# Patient Record
Sex: MALE | Race: WHITE | NOT HISPANIC OR LATINO
[De-identification: names, ages, dates, MRNs, and addresses within clinical notes are randomized per-mention and may not be internally consistent; named-entity substitution may affect disease eponyms.]

---

## 2018-05-17 ENCOUNTER — NON-APPOINTMENT (OUTPATIENT)
Age: 68
End: 2018-05-17

## 2018-05-17 ENCOUNTER — APPOINTMENT (OUTPATIENT)
Dept: CARDIOLOGY | Facility: CLINIC | Age: 68
End: 2018-05-17
Payer: MEDICARE

## 2018-05-17 VITALS
HEART RATE: 81 BPM | HEIGHT: 70 IN | DIASTOLIC BLOOD PRESSURE: 85 MMHG | OXYGEN SATURATION: 94 % | WEIGHT: 227 LBS | SYSTOLIC BLOOD PRESSURE: 127 MMHG | BODY MASS INDEX: 32.5 KG/M2

## 2018-05-17 VITALS — SYSTOLIC BLOOD PRESSURE: 124 MMHG | DIASTOLIC BLOOD PRESSURE: 80 MMHG

## 2018-05-17 PROCEDURE — 93000 ELECTROCARDIOGRAM COMPLETE: CPT

## 2018-05-17 PROCEDURE — 99215 OFFICE O/P EST HI 40 MIN: CPT

## 2018-05-17 RX ORDER — MULTIVITAMIN
TABLET ORAL
Refills: 0 | Status: ACTIVE | COMMUNITY

## 2018-06-28 ENCOUNTER — APPOINTMENT (OUTPATIENT)
Dept: CARDIOLOGY | Facility: CLINIC | Age: 68
End: 2018-06-28
Payer: MEDICARE

## 2018-06-28 PROCEDURE — 93015 CV STRESS TEST SUPVJ I&R: CPT

## 2018-07-03 ENCOUNTER — APPOINTMENT (OUTPATIENT)
Dept: CARDIOLOGY | Facility: CLINIC | Age: 68
End: 2018-07-03
Payer: MEDICARE

## 2018-07-03 PROCEDURE — 93306 TTE W/DOPPLER COMPLETE: CPT

## 2019-06-11 ENCOUNTER — NON-APPOINTMENT (OUTPATIENT)
Age: 69
End: 2019-06-11

## 2019-06-11 ENCOUNTER — APPOINTMENT (OUTPATIENT)
Dept: CARDIOLOGY | Facility: CLINIC | Age: 69
End: 2019-06-11
Payer: MEDICARE

## 2019-06-11 VITALS
DIASTOLIC BLOOD PRESSURE: 92 MMHG | SYSTOLIC BLOOD PRESSURE: 153 MMHG | OXYGEN SATURATION: 95 % | HEIGHT: 70 IN | BODY MASS INDEX: 28.92 KG/M2 | WEIGHT: 202 LBS | HEART RATE: 94 BPM

## 2019-06-11 PROCEDURE — 93000 ELECTROCARDIOGRAM COMPLETE: CPT

## 2019-06-11 PROCEDURE — 99215 OFFICE O/P EST HI 40 MIN: CPT

## 2019-06-11 RX ORDER — SITAGLIPTIN AND METFORMIN HYDROCHLORIDE 50; 1000 MG/1; MG/1
50-1000 TABLET, FILM COATED ORAL DAILY
Refills: 0 | Status: DISCONTINUED | COMMUNITY
End: 2019-06-11

## 2019-06-12 NOTE — PHYSICAL EXAM
[General Appearance - Well Developed] : well developed [General Appearance - In No Acute Distress] : no acute distress [Normal Conjunctiva] : the conjunctiva exhibited no abnormalities [No Oral Pallor] : no oral pallor [Exaggerated Use Of Accessory Muscles For Inspiration] : no accessory muscle use [Respiration, Rhythm And Depth] : normal respiratory rhythm and effort [Auscultation Breath Sounds / Voice Sounds] : lungs were clear to auscultation bilaterally [Bowel Sounds] : normal bowel sounds [Abdomen Tenderness] : non-tender [Cyanosis, Localized] : no localized cyanosis [Abnormal Walk] : normal gait [] : no rash [Oriented To Time, Place, And Person] : oriented to person, place, and time [Not Palpable] : not palpable [No Precordial Heave] : no precordial heave was noted [Normal Rate] : normal [Rhythm Regular] : regular [Normal S1] : normal S1 [Normal S2] : normal S2 [No Gallop] : no gallop heard [I] : a grade 1 [2+] : left 2+ [No Abnormalities] : the abdominal aorta was not enlarged and no bruit was heard [No Pitting Edema] : no pitting edema present [FreeTextEntry1] : no JVD is appreciated at a 45° angle [Apical Thrill] : no thrill palpable at the apex [Pericardial Rub] : no pericardial rub [Click] : no click [Right Carotid Bruit] : no bruit heard over the right carotid [Left Carotid Bruit] : no bruit heard over the left carotid

## 2019-06-12 NOTE — DISCUSSION/SUMMARY
[FreeTextEntry1] : Mr. Cruz has been stable from a cardiac symptomatic standpoint since his previous visit here on 5/17/18. Specifically, he does not describe having experienced any signs or symptoms which would be considered typical for an anginal syndrome, congestive heart failure, or a hemodynamically-compromising arrhythmia. His cardiac examination today is remarkable for a faint systolic ejection murmur, unchanged from his previous visit with me. His blood pressure reading  today is mildly elevated. His electrocardiogram today reveals mild sinus tachycardia with a non-specific RSR' pattern in lead V1 and non-specific early R wave transition in lead V2, essentially unchanged from his previous office tracing.\par \par I have explained to the patient that he was being treated previously with beta-blocker therapy not only for blood pressure control, however, specifically to reduce "shearing forces" on his ascending aorta, in view of the ascending aorta being mildly dilated. As his blood pressure is mildly elevated today, I have recommended to him that he resume Toprol-XL at a dosage of 50 mg daily. He is agreeable, and will resume this medication as of today.\par \par I have reviewed the findings of the exercise stress test of 6/28/18 and the echocardiogram of 7/3/18 in detail with the patient today.\par \par The patient is going to forward a copy of his most recent coronary calcium score report to my office for my review. If the ascending aortic diameter is noted to be stable, follow-up echocardiography for reassessment of the aortic root diameter will be deferred at this time.\par \par The patient anticipates having follow-up blood testing performed through the office of his primary care provider in the near future, and he will have a copy of the results forwarded to my office for my review and records.\par \par The importance of proper dietary habits, further weight loss, and regular exercise was again emphasized to the the patient today.\par \par The patient will be following up regularly with his primary care provider regarding management of his diabetes.\par \par I have asked the patient to call me if he should have any questions or problems pertaining to these matters, and especially if he should experience any concerning symptoms. I have otherwise asked the patient to return to the office in one month for follow-up cardiac evaluation and blood pressure reassessment (on Toprol XL), provided he remains clinically stable in the interim.

## 2019-06-12 NOTE — HISTORY OF PRESENT ILLNESS
[FreeTextEntry1] : Mr. Meghana Cruz presented to the office today for follow-up cardiac evaluation.\par \par The patient is a 69-year-old male with a history of palpitations, a heart murmur, a mildly dilated aortic root/ascending aorta, hypertension, diabetes, anxiety, and tinnitus (attributed to exposure to loud  sounds while he served in the [employer]). He was initially referred to me by his father-in-law, Mr. Clemente Perez, who I had been caring for until he passed away.\par \par The patient has been stable from a cardiac symptomatic standpoint since his previous visit with me on 5/17/18. Specifically, he has not experienced recurrence of previously described palpitations. He has not noted chest discomfort or dyspnea on exertion in association with his activities. He has not noted orthopnea, paroxysmal nocturnal dyspnea, or lower extremity edema. He has not experienced any episodes of presyncope or syncope.\par \par The patient has lost 25 pounds through dietary modification and exercise since his previous visit here on 5/17/18. Several months ago, he decided to discontinue his diabetic medications and his antihypertensive medications (Toprol-XL 50 mg q.d. and lisinopril 10 mg q.d.), thinking that he did not require these medications any longer, since he was able to lose weight. He reports his hemoglobin A1c level to have been 5.8 recently.\par \par Echocardiography most recently performed on 7/3/18 revealed normal cardiac chamber sizes with normal left ventricular wall thickness and wall motion. Left ventricular systolic function was normal, with an estimated ejection fraction of 65%. Impaired diastolic relaxation of the left ventricle was demonstrated. The aortic root was noted to be mildly dilated, measuring 4.2 cm at the level of the sinuses of Valsalva. No significant valvular abnormalities were demonstrated.\par \par Exercise stress testing most recently performed on 6/28/18 revealed the patient to exhibit fair exercise tolerance for his age, without the inducement of cardiac symptoms, electrocardiographic evidence of myocardial ischemia, or significant arrhythmias.\par \par The patient reports having undergone a coronary calcium score study recently, and states that his coronary calcium score was 0. He also states that the report revealed his aortic root diameter to be "stable". He is going to forward a copy of the report of the study to my office for my review.\par \par The patient was previously referred for a CT scan of the chest by his internist, Dr. Staples. Review of the report of the study, performed on 2/12/15 revealed that the aortic root measurement was 4.4 cm in diameter and the ascending aorta was 3.9 cm in diameter. The aortic arch and descending thoracic aorta measured 3.0 and 2.5 cm in diameter, respectively.\par \par Carotid artery Doppler testing performed at the radiologist's office on 10/1/13 was reported as not demonstrating any evidence for atherosclerosis formation or stenoses.\par \par Cardiac rhythm event monitoring to utilize for recurrent palpitations from 8/29/13 through 9/26/13 was unrevealing, as the patient did not experience any recurrence of palpitations during this monitoring period.\par \par Laboratory studies performed on 5/7/18 revealed cholesterol 147, triglycerides 153, HDL 35, and calculated LDL 81. The ALT level was mildly elevated at 58. The remainder of the liver chemistries were normal. The glucose level was 134 and hemoglobin A1c level is 7.1%. The BUN and creatinine were 13 and 1.05, respectively. The potassium level was 4.4. The hemoglobin and hematocrit were 15.9 and 46.4, respectively. The thyroid stimulating hormone level was 2.05.\par \par Previous History:\par \par At the time of a previous visit here on 8/28/13, the patient informed me that approximately 2 months prior to that visit, while having an MRI scan performed to evaluate his left shoulder, he developed an anxiety reaction, associated with a sensation of an increased heart rate. The MRI scan had to be aborted secondary to his anxiety reaction. Following this initial anxiety reaction, the patient had been experiencing recurrent episodes of feeling as if his heart was beating strongly and his heart rate was fast, associated with a feeling of anxiety. At that time, I instructed him to increase his dosage of Toprol-XL from 25 mg daily (which he was taking for treatment of hypertension) to 50 mg daily, and I referred him for further cardiac studies.\par \par As far as risk factors for coronary artery disease are concerned, the patient has a history of diabetes and hypertension. He denies having any dyslipidemia. He discontinued cigarette smoking in 1983, after having previously smoked one pack per day for several years. He does not describe having an immediate family history of premature coronary artery disease.\par \par \par \par \par

## 2019-06-12 NOTE — REASON FOR VISIT
[Hypertension] : hypertension [Palpitations] : palpitations [FreeTextEntry1] : a dilated aortic root/ascending aorta

## 2019-07-10 ENCOUNTER — NON-APPOINTMENT (OUTPATIENT)
Age: 69
End: 2019-07-10

## 2019-07-10 ENCOUNTER — APPOINTMENT (OUTPATIENT)
Dept: CARDIOLOGY | Facility: CLINIC | Age: 69
End: 2019-07-10
Payer: MEDICARE

## 2019-07-10 VITALS
HEIGHT: 70 IN | SYSTOLIC BLOOD PRESSURE: 128 MMHG | DIASTOLIC BLOOD PRESSURE: 82 MMHG | WEIGHT: 198 LBS | BODY MASS INDEX: 28.35 KG/M2 | HEART RATE: 81 BPM | OXYGEN SATURATION: 95 %

## 2019-07-10 PROCEDURE — 99215 OFFICE O/P EST HI 40 MIN: CPT

## 2019-07-10 PROCEDURE — 93000 ELECTROCARDIOGRAM COMPLETE: CPT

## 2019-07-10 NOTE — HISTORY OF PRESENT ILLNESS
[FreeTextEntry1] : Mr. Meghana Cruz presented to the office today for follow-up cardiac evaluation. I last evaluated the patient in the office on 6/11/19.\par \par The patient is a 69-year-old male with a history of palpitations, a heart murmur, a mildly dilated aortic root/ascending aorta, hypertension, diabetes, anxiety, and tinnitus (attributed to exposure to loud  sounds while he served in the [employer] He was initially referred to me by his father-in-law, Mr. Clemente Perez, who I had been caring for until he passed away.\par \par The patient has been stable from a cardiac symptomatic standpoint since his previous visit with me on 6/11/19. Specifically, he has not experienced recurrence of previously described palpitations. He has not noted chest discomfort or dyspnea on exertion in association with his activities. He has not noted orthopnea, paroxysmal nocturnal dyspnea, or lower extremity edema. He has not experienced any episodes of presyncope or syncope.\par \par Several months ago, the patient decided to discontinue his diabetic medications and his antihypertensive medications (Toprol-XL 50 mg q.d. and lisinopril 10 mg q.d.), thinking that he did not require these medications any longer, since he was able to lose weight. He reports his hemoglobin A1c level to have been 5.8 recently. At the time the patient's previous visit on 6/11/19, I explained to him that the beta-blocker (Toprol-XL) had been prescribed not only had antihypertensive medication, however, specifically to reduce "shearing forces" on the ascending aorta, and due to the patient having a history of a dilated aortic root and ascending aorta. Toprol-XL was restarted at that time at a dosage of 50 mg daily, and the patient has been tolerating the Toprol-XL without any obvious side effects.\par \par A CT-calcium scoring study performed on 2/21/19 revealed a total coronary calcium score to be zero. Evaluation of the thoracic aorta revealed the aortic root and the ascending aorta to be dilated, demonstrating maximum diameter is 4.5 cm and 4.0 cm, respectively. These dimensions were considered not to be significantly changed when compared with a previous CT study performed on 2/12/15.\par \par Echocardiography most recently performed on 7/3/18 revealed normal cardiac chamber sizes with normal left ventricular wall thickness and wall motion. Left ventricular systolic function was normal, with an estimated ejection fraction of 65%. Impaired diastolic relaxation of the left ventricle was demonstrated. The aortic root was noted to be mildly dilated, measuring 4.2 cm at the level of the sinuses of Valsalva. No significant valvular abnormalities were demonstrated.\par \par Exercise stress testing most recently performed on 6/28/18 revealed the patient to exhibit fair exercise tolerance for his age, without the inducement of cardiac symptoms, electrocardiographic evidence of myocardial ischemia, or significant arrhythmias.\par \par Carotid artery Doppler testing performed at the radiologist's office on 10/1/13 was reported as not demonstrating any evidence for atherosclerosis formation or stenoses.\par \par Cardiac rhythm event monitoring to utilize for recurrent palpitations from 8/29/13 through 9/26/13 was unrevealing, as the patient did not experience any recurrence of palpitations during this monitoring period.\par \par Laboratory studies performed on 5/7/18 revealed cholesterol 147, triglycerides 153, HDL 35, and calculated LDL 81. The ALT level was mildly elevated at 58. The remainder of the liver chemistries were normal. The glucose level was 134 and hemoglobin A1c level is 7.1%. The BUN and creatinine were 13 and 1.05, respectively. The potassium level was 4.4. The hemoglobin and hematocrit were 15.9 and 46.4, respectively. The thyroid stimulating hormone level was 2.05.\par \par Previous History:\par \par At the time of a previous visit here on 8/28/13, the patient informed me that approximately 2 months prior to that visit, while having an MRI scan performed to evaluate his left shoulder, he developed an anxiety reaction, associated with a sensation of an increased heart rate. The MRI scan had to be aborted secondary to his anxiety reaction. Following this initial anxiety reaction, the patient had been experiencing recurrent episodes of feeling as if his heart was beating strongly and his heart rate was fast, associated with a feeling of anxiety. At that time, I instructed him to increase his dosage of Toprol-XL from 25 mg daily (which he was taking for treatment of hypertension) to 50 mg daily, and I referred him for further cardiac studies.\par \par As far as risk factors for coronary artery disease are concerned, the patient has a history of diabetes and hypertension. He denies having any dyslipidemia. He discontinued cigarette smoking in 1983, after having previously smoked one pack per day for several years. He does not describe having an immediate family history of premature coronary artery disease.\par \par \par \par \par

## 2019-07-10 NOTE — DISCUSSION/SUMMARY
[FreeTextEntry1] : Mr. Cruz has been stable from a cardiac symptomatic standpoint since his previous visit here on 6/11/19. Specifically, he does not describe having experienced any signs or symptoms which would be considered typical for an anginal syndrome, congestive heart failure, or a hemodynamically-compromising arrhythmia. His cardiac examination today is remarkable for a faint systolic ejection murmur, unchanged from his previous visit with me. His blood pressure reading  today is normal. His electrocardiogram today reveals sinus rhythm with a non-specific RSR' pattern in lead V1, non-specific early R wave transition in lead V2, and non-specific repolarization changes, essentially unchanged from his previous office tracing.\par \par I have again explained to the patient that he is being treated with beta-blocker therapy not only for blood pressure control, however, specifically to reduce "shearing forces" on his ascending aorta, in view of the aortic root and ascending aorta being mildly to moderately dilated.  As his blood pressure is normal today, I have recommended to him that he continue Toprol-XL at a dosage of 50 mg daily for the time being.\par \par I have reviewed the findings of the exercise stress test of 6/28/18 and the echocardiogram of 7/3/18 in detail with the patient today.\par \par The patient is going to forward a copy of the report of his most recent blood testing performed through the office of his primary care provider, Dr. Staples, to my office for my review and records.\par \par The importance of proper dietary habits, further weight loss, and regular exercise was again emphasized to the the patient today.\par \par The patient will be following up regularly with his primary care provider regarding management of his diabetes.\par \par I have asked the patient to call me if he should have any questions or problems pertaining to these matters, and especially if he should experience any concerning symptoms. I have otherwise asked the patient to return to the office for follow-up cardiac evaluation and blood pressure reassessment in 6 months, provided he remains clinically stable in the interim. Follow-up echocardiography will be arranged at that time for reassessment of the aortic root diameter.

## 2019-07-10 NOTE — PHYSICAL EXAM
[General Appearance - Well Developed] : well developed [General Appearance - In No Acute Distress] : no acute distress [No Oral Pallor] : no oral pallor [Normal Conjunctiva] : the conjunctiva exhibited no abnormalities [Exaggerated Use Of Accessory Muscles For Inspiration] : no accessory muscle use [Respiration, Rhythm And Depth] : normal respiratory rhythm and effort [Auscultation Breath Sounds / Voice Sounds] : lungs were clear to auscultation bilaterally [Bowel Sounds] : normal bowel sounds [Abnormal Walk] : normal gait [Abdomen Tenderness] : non-tender [Oriented To Time, Place, And Person] : oriented to person, place, and time [] : no rash [Cyanosis, Localized] : no localized cyanosis [No Precordial Heave] : no precordial heave was noted [Not Palpable] : not palpable [Normal Rate] : normal [Normal S1] : normal S1 [Rhythm Regular] : regular [No Gallop] : no gallop heard [Normal S2] : normal S2 [I] : a grade 1 [2+] : left 2+ [No Abnormalities] : the abdominal aorta was not enlarged and no bruit was heard [No Pitting Edema] : no pitting edema present [FreeTextEntry1] : no JVD is appreciated at a 45° angle [Apical Thrill] : no thrill palpable at the apex [Click] : no click [Pericardial Rub] : no pericardial rub [Right Carotid Bruit] : no bruit heard over the right carotid [Left Carotid Bruit] : no bruit heard over the left carotid

## 2022-10-24 ENCOUNTER — APPOINTMENT (OUTPATIENT)
Dept: CARDIOLOGY | Facility: CLINIC | Age: 72
End: 2022-10-24

## 2022-10-24 ENCOUNTER — NON-APPOINTMENT (OUTPATIENT)
Age: 72
End: 2022-10-24

## 2022-10-24 VITALS
WEIGHT: 199 LBS | DIASTOLIC BLOOD PRESSURE: 82 MMHG | SYSTOLIC BLOOD PRESSURE: 156 MMHG | OXYGEN SATURATION: 98 % | BODY MASS INDEX: 28.49 KG/M2 | HEART RATE: 80 BPM | HEIGHT: 70 IN

## 2022-10-24 PROCEDURE — 93000 ELECTROCARDIOGRAM COMPLETE: CPT

## 2022-10-24 PROCEDURE — 99215 OFFICE O/P EST HI 40 MIN: CPT

## 2022-10-24 RX ORDER — GLUCOSAMINE HCL 500 MG
TABLET ORAL
Refills: 0 | Status: DISCONTINUED | COMMUNITY
End: 2022-10-24

## 2022-11-02 ENCOUNTER — OFFICE (OUTPATIENT)
Dept: URBAN - METROPOLITAN AREA CLINIC 109 | Facility: CLINIC | Age: 72
Setting detail: OPHTHALMOLOGY
End: 2022-11-02
Payer: MEDICARE

## 2022-11-02 DIAGNOSIS — Z96.1: ICD-10-CM

## 2022-11-02 DIAGNOSIS — H52.7: ICD-10-CM

## 2022-11-02 PROCEDURE — 99024 POSTOP FOLLOW-UP VISIT: CPT | Performed by: OPHTHALMOLOGY

## 2022-11-02 ASSESSMENT — REFRACTION_CURRENTRX
OD_OVR_VA: 20/
OD_OVR_VA: 20/
OS_SPHERE: +1.50
OS_OVR_VA: 20/
OS_OVR_VA: 20/
OS_SPHERE: +3.25
OD_SPHERE: +1.50
OD_SPHERE: +3.00

## 2022-11-02 ASSESSMENT — REFRACTION_MANIFEST
OD_VA1: 20/30-
OS_VA1: 20/30-
OS_CYLINDER: -0.75
OD_SPHERE: +0.50
OS_SPHERE: PLANO
OD_SPHERE: +2.00
OS_SPHERE: +0.75
OS_VA1: 20/20
OS_AXIS: 100
OD_VA1: 20/20-

## 2022-11-02 ASSESSMENT — KERATOMETRY
OS_K1POWER_DIOPTERS: 42.62
OS_K2POWER_DIOPTERS: 43.50
OS_AXISANGLE_DEGREES: 167
OD_AXISANGLE_DEGREES: 115
OD_K2POWER_DIOPTERS: 42.87
OD_K1POWER_DIOPTERS: 42.75

## 2022-11-02 ASSESSMENT — VISUAL ACUITY
OD_BCVA: 20/25
OS_BCVA: 20/40-2

## 2022-11-02 ASSESSMENT — LID POSITION - DERMATOCHALASIS
OS_DERMATOCHALASIS: 1+
OD_DERMATOCHALASIS: 1+

## 2022-11-02 ASSESSMENT — PACHYMETRY
OD_CT_CORRECTION: 5
OS_CT_UM: 503
OD_CT_UM: 477
OS_CT_CORRECTION: 3

## 2022-11-02 ASSESSMENT — LID POSITION - PTOSIS: OS_PTOSIS: LUL 1+

## 2022-11-02 ASSESSMENT — REFRACTION_AUTOREFRACTION
OS_SPHERE: PL
OS_CYLINDER: -0.75
OD_SPHERE: +0.50
OS_AXIS: 100

## 2022-11-02 ASSESSMENT — TONOMETRY
OD_IOP_MMHG: 14
OS_IOP_MMHG: 15

## 2022-11-03 ENCOUNTER — TRANSCRIPTION ENCOUNTER (OUTPATIENT)
Age: 72
End: 2022-11-03

## 2022-11-03 ENCOUNTER — APPOINTMENT (OUTPATIENT)
Dept: CARDIOLOGY | Facility: CLINIC | Age: 72
End: 2022-11-03

## 2022-11-03 ENCOUNTER — NON-APPOINTMENT (OUTPATIENT)
Age: 72
End: 2022-11-03

## 2022-11-03 PROCEDURE — 93306 TTE W/DOPPLER COMPLETE: CPT

## 2022-11-07 ENCOUNTER — OFFICE (OUTPATIENT)
Dept: URBAN - METROPOLITAN AREA CLINIC 109 | Facility: CLINIC | Age: 72
Setting detail: OPHTHALMOLOGY
End: 2022-11-07
Payer: MEDICARE

## 2022-11-07 DIAGNOSIS — H11.31: ICD-10-CM

## 2022-11-07 PROCEDURE — 99213 OFFICE O/P EST LOW 20 MIN: CPT | Performed by: OPHTHALMOLOGY

## 2022-11-07 ASSESSMENT — REFRACTION_CURRENTRX
OD_SPHERE: +1.50
OS_SPHERE: +1.50
OS_SPHERE: +3.25
OD_OVR_VA: 20/
OD_OVR_VA: 20/
OS_OVR_VA: 20/
OD_SPHERE: +3.00
OS_OVR_VA: 20/

## 2022-11-07 ASSESSMENT — LID POSITION - DERMATOCHALASIS
OD_DERMATOCHALASIS: 1+
OS_DERMATOCHALASIS: 1+

## 2022-11-07 ASSESSMENT — KERATOMETRY
OS_K1POWER_DIOPTERS: 42.62
OD_K2POWER_DIOPTERS: 42.87
OD_AXISANGLE_DEGREES: 115
OD_K1POWER_DIOPTERS: 42.75
OS_AXISANGLE_DEGREES: 167
OS_K2POWER_DIOPTERS: 43.50

## 2022-11-07 ASSESSMENT — REFRACTION_MANIFEST
OD_AXIS: 90
OS_CYLINDER: -0.75
OS_VA1: 20/30-
OD_CYLINDER: -0.50
OD_SPHERE: +0.50
OS_SPHERE: PLANO
OD_SPHERE: +2.00
OS_SPHERE: +0.75
OS_VA1: 20/20
OS_AXIS: 100
OD_VA1: 20/20-
OD_VA1: 20/20-

## 2022-11-07 ASSESSMENT — VISUAL ACUITY
OS_BCVA: 20/40-1
OD_BCVA: 20/25

## 2022-11-07 ASSESSMENT — TONOMETRY: OD_IOP_MMHG: 15

## 2022-11-07 ASSESSMENT — PACHYMETRY
OD_CT_CORRECTION: 5
OD_CT_UM: 477
OS_CT_UM: 503
OS_CT_CORRECTION: 3

## 2022-11-07 ASSESSMENT — SPHEQUIV_DERIVED: OD_SPHEQUIV: 0.25

## 2022-11-07 ASSESSMENT — LID POSITION - PTOSIS: OS_PTOSIS: LUL 1+

## 2022-11-07 ASSESSMENT — AXIALLENGTH_DERIVED: OD_AL: 23.749

## 2022-11-07 ASSESSMENT — REFRACTION_AUTOREFRACTION
OD_SPHERE: +0.50
OS_AXIS: 100
OS_CYLINDER: -0.75
OS_SPHERE: PL

## 2022-11-16 ENCOUNTER — OFFICE (OUTPATIENT)
Dept: URBAN - METROPOLITAN AREA CLINIC 109 | Facility: CLINIC | Age: 72
Setting detail: OPHTHALMOLOGY
End: 2022-11-16
Payer: MEDICARE

## 2022-11-16 DIAGNOSIS — H40.1111: ICD-10-CM

## 2022-11-16 DIAGNOSIS — H40.1122: ICD-10-CM

## 2022-11-16 PROBLEM — H11.31 SUBCONJUNCTIVAL HEMORRHAGE; RIGHT EYE: Status: RESOLVED | Noted: 2022-11-07 | Resolved: 2022-11-16

## 2022-11-16 PROCEDURE — 99212 OFFICE O/P EST SF 10 MIN: CPT | Performed by: OPHTHALMOLOGY

## 2022-11-16 PROCEDURE — 92083 EXTENDED VISUAL FIELD XM: CPT | Performed by: OPHTHALMOLOGY

## 2022-11-16 ASSESSMENT — PACHYMETRY
OD_CT_UM: 477
OD_CT_CORRECTION: 5
OS_CT_CORRECTION: 3
OS_CT_UM: 503

## 2022-11-16 ASSESSMENT — REFRACTION_CURRENTRX
OD_SPHERE: +1.50
OS_OVR_VA: 20/
OD_SPHERE: +3.00
OS_SPHERE: +1.50
OS_OVR_VA: 20/
OD_OVR_VA: 20/
OS_SPHERE: +3.25
OD_OVR_VA: 20/

## 2022-11-16 ASSESSMENT — REFRACTION_MANIFEST
OD_SPHERE: +2.00
OS_SPHERE: PLANO
OD_VA1: 20/20-
OS_CYLINDER: -0.75
OS_SPHERE: +0.75
OD_SPHERE: +1.00
OS_VA1: 20/20
OS_AXIS: 100
OD_VA1: 20/20+
OS_VA1: 20/30-

## 2022-11-16 ASSESSMENT — VISUAL ACUITY
OS_BCVA: 20/40-1
OD_BCVA: 20/25

## 2022-11-16 ASSESSMENT — LID POSITION - DERMATOCHALASIS
OS_DERMATOCHALASIS: 1+
OD_DERMATOCHALASIS: 1+

## 2022-11-16 ASSESSMENT — CONFRONTATIONAL VISUAL FIELD TEST (CVF)
OS_FINDINGS: FULL
OD_FINDINGS: FULL

## 2022-11-16 ASSESSMENT — TONOMETRY
OD_IOP_MMHG: 12
OS_IOP_MMHG: 15

## 2022-11-16 ASSESSMENT — KERATOMETRY
OD_K2POWER_DIOPTERS: 42.87
OS_K1POWER_DIOPTERS: 42.62
OS_K2POWER_DIOPTERS: 43.50
OD_K1POWER_DIOPTERS: 42.75
OS_AXISANGLE_DEGREES: 167
OD_AXISANGLE_DEGREES: 115

## 2022-11-16 ASSESSMENT — REFRACTION_AUTOREFRACTION
OS_CYLINDER: -0.75
OD_SPHERE: +0.50
OS_AXIS: 100
OS_SPHERE: PL

## 2022-11-16 ASSESSMENT — LID POSITION - PTOSIS: OS_PTOSIS: LUL 1+

## 2023-01-04 ENCOUNTER — NON-APPOINTMENT (OUTPATIENT)
Age: 73
End: 2023-01-04

## 2023-01-04 ENCOUNTER — APPOINTMENT (OUTPATIENT)
Dept: CARDIOLOGY | Facility: CLINIC | Age: 73
End: 2023-01-04
Payer: MEDICARE

## 2023-01-04 VITALS
SYSTOLIC BLOOD PRESSURE: 146 MMHG | DIASTOLIC BLOOD PRESSURE: 80 MMHG | HEIGHT: 70 IN | HEART RATE: 73 BPM | WEIGHT: 200 LBS | OXYGEN SATURATION: 97 % | BODY MASS INDEX: 28.63 KG/M2

## 2023-01-04 DIAGNOSIS — R01.1 CARDIAC MURMUR, UNSPECIFIED: ICD-10-CM

## 2023-01-04 PROCEDURE — 99215 OFFICE O/P EST HI 40 MIN: CPT

## 2023-01-04 PROCEDURE — 93000 ELECTROCARDIOGRAM COMPLETE: CPT

## 2023-01-04 RX ORDER — METOPROLOL TARTRATE 50 MG/1
50 TABLET, FILM COATED ORAL
Qty: 180 | Refills: 0 | Status: DISCONTINUED | COMMUNITY
Start: 2022-08-25 | End: 2023-01-04

## 2023-05-16 ENCOUNTER — OFFICE (OUTPATIENT)
Dept: URBAN - METROPOLITAN AREA CLINIC 109 | Facility: CLINIC | Age: 73
Setting detail: OPHTHALMOLOGY
End: 2023-05-16
Payer: MEDICARE

## 2023-05-16 VITALS — HEIGHT: 60 IN

## 2023-05-16 DIAGNOSIS — H40.1122: ICD-10-CM

## 2023-05-16 DIAGNOSIS — H52.7: ICD-10-CM

## 2023-05-16 DIAGNOSIS — H40.1111: ICD-10-CM

## 2023-05-16 PROCEDURE — 92015 DETERMINE REFRACTIVE STATE: CPT | Performed by: OPHTHALMOLOGY

## 2023-05-16 PROCEDURE — 92083 EXTENDED VISUAL FIELD XM: CPT | Performed by: OPHTHALMOLOGY

## 2023-05-16 PROCEDURE — 92012 INTRM OPH EXAM EST PATIENT: CPT | Performed by: OPHTHALMOLOGY

## 2023-05-16 ASSESSMENT — TONOMETRY
OS_IOP_MMHG: 14
OD_IOP_MMHG: 16

## 2023-05-16 ASSESSMENT — KERATOMETRY
OD_K2POWER_DIOPTERS: 42.87
OD_AXISANGLE_DEGREES: 115
OD_K1POWER_DIOPTERS: 42.75
OS_K1POWER_DIOPTERS: 42.62
OS_K2POWER_DIOPTERS: 43.50
OS_AXISANGLE_DEGREES: 167

## 2023-05-16 ASSESSMENT — REFRACTION_CURRENTRX
OS_OVR_VA: 20/
OS_SPHERE: +1.50
OD_SPHERE: +3.00
OD_SPHERE: +1.50
OS_OVR_VA: 20/
OD_OVR_VA: 20/
OD_OVR_VA: 20/
OS_SPHERE: +3.25

## 2023-05-16 ASSESSMENT — REFRACTION_MANIFEST
OS_CYLINDER: -2.00
OS_VA1: 20/20-
OD_SPHERE: +0.50
OS_AXIS: 105
OD_VA1: 20/20+
OS_AXIS: 105
OD_SPHERE: +1.00
OS_AXIS: 100
OD_SPHERE: +2.00
OS_SPHERE: +2.25
OS_CYLINDER: -1.50
OS_CYLINDER: -0.75
OS_SPHERE: +1.00
OS_SPHERE: PLANO
OS_VA1: 20/20
OD_VA1: 20/20-

## 2023-05-16 ASSESSMENT — PACHYMETRY
OS_CT_CORRECTION: 3
OD_CT_CORRECTION: 5
OD_CT_UM: 477
OS_CT_UM: 503

## 2023-05-16 ASSESSMENT — REFRACTION_AUTOREFRACTION
OS_CYLINDER: -1.75
OS_SPHERE: +1.00
OD_CYLINDER: -0.25
OD_SPHERE: +0.50
OS_AXIS: 111
OD_AXIS: 113

## 2023-05-16 ASSESSMENT — LID POSITION - PTOSIS: OS_PTOSIS: LUL 1+

## 2023-05-16 ASSESSMENT — LID POSITION - DERMATOCHALASIS
OD_DERMATOCHALASIS: 1+
OS_DERMATOCHALASIS: 1+

## 2023-05-16 ASSESSMENT — AXIALLENGTH_DERIVED
OD_AL: 23.6997
OS_AL: 23.7054
OS_AL: 23.7546
OS_AL: 23.1766

## 2023-05-16 ASSESSMENT — SPHEQUIV_DERIVED
OS_SPHEQUIV: 1.5
OD_SPHEQUIV: 0.375
OS_SPHEQUIV: 0.125
OS_SPHEQUIV: 0

## 2023-05-16 ASSESSMENT — VISUAL ACUITY
OS_BCVA: 20/25-1
OD_BCVA: 20/25

## 2023-05-16 ASSESSMENT — CONFRONTATIONAL VISUAL FIELD TEST (CVF)
OD_FINDINGS: FULL
OS_FINDINGS: FULL

## 2023-05-30 ENCOUNTER — OFFICE (OUTPATIENT)
Dept: URBAN - METROPOLITAN AREA CLINIC 109 | Facility: CLINIC | Age: 73
Setting detail: OPHTHALMOLOGY
End: 2023-05-30
Payer: MEDICARE

## 2023-05-30 VITALS — HEIGHT: 60 IN

## 2023-05-30 DIAGNOSIS — H40.1111: ICD-10-CM

## 2023-05-30 DIAGNOSIS — H40.1122: ICD-10-CM

## 2023-05-30 PROCEDURE — 92133 CPTRZD OPH DX IMG PST SGM ON: CPT | Performed by: OPHTHALMOLOGY

## 2023-05-30 PROCEDURE — 92012 INTRM OPH EXAM EST PATIENT: CPT | Performed by: OPHTHALMOLOGY

## 2023-05-30 ASSESSMENT — REFRACTION_MANIFEST
OS_CYLINDER: -0.75
OD_SPHERE: +2.00
OS_CYLINDER: -1.50
OS_VA1: 20/20-
OS_AXIS: 100
OD_VA1: 20/20+
OD_VA1: 20/20-
OS_SPHERE: +2.25
OD_SPHERE: +1.00
OD_SPHERE: +0.50
OS_CYLINDER: -2.00
OS_SPHERE: PLANO
OS_VA1: 20/20
OS_SPHERE: +1.00
OS_AXIS: 105
OS_AXIS: 105

## 2023-05-30 ASSESSMENT — VISUAL ACUITY
OS_BCVA: 20/20
OD_BCVA: 20/20-2

## 2023-05-30 ASSESSMENT — LID POSITION - DERMATOCHALASIS
OS_DERMATOCHALASIS: 1+
OD_DERMATOCHALASIS: 1+

## 2023-05-30 ASSESSMENT — REFRACTION_AUTOREFRACTION
OD_SPHERE: +0.50
OD_CYLINDER: -0.25
OS_SPHERE: +1.00
OS_CYLINDER: -1.75
OD_AXIS: 113
OS_AXIS: 111

## 2023-05-30 ASSESSMENT — KERATOMETRY
OS_AXISANGLE_DEGREES: 167
OD_K2POWER_DIOPTERS: 42.87
OS_K1POWER_DIOPTERS: 42.62
OD_AXISANGLE_DEGREES: 115
OS_K2POWER_DIOPTERS: 43.50
OD_K1POWER_DIOPTERS: 42.75

## 2023-05-30 ASSESSMENT — LID POSITION - PTOSIS: OS_PTOSIS: LUL 1+

## 2023-05-30 ASSESSMENT — PACHYMETRY
OS_CT_CORRECTION: 3
OD_CT_UM: 477
OD_CT_CORRECTION: 5
OS_CT_UM: 503

## 2023-05-30 ASSESSMENT — AXIALLENGTH_DERIVED
OS_AL: 23.7054
OD_AL: 23.6997
OS_AL: 23.1766
OS_AL: 23.7546

## 2023-05-30 ASSESSMENT — REFRACTION_CURRENTRX
OD_OVR_VA: 20/
OS_OVR_VA: 20/
OS_SPHERE: +3.25
OD_OVR_VA: 20/
OD_SPHERE: +1.50
OD_SPHERE: +3.00
OS_OVR_VA: 20/
OS_SPHERE: +1.50

## 2023-05-30 ASSESSMENT — TONOMETRY
OS_IOP_MMHG: 15
OD_IOP_MMHG: 16

## 2023-05-30 ASSESSMENT — SPHEQUIV_DERIVED
OS_SPHEQUIV: 0.125
OS_SPHEQUIV: 0
OS_SPHEQUIV: 1.5
OD_SPHEQUIV: 0.375

## 2023-07-26 ENCOUNTER — APPOINTMENT (OUTPATIENT)
Dept: CARDIOLOGY | Facility: CLINIC | Age: 73
End: 2023-07-26
Payer: MEDICARE

## 2023-07-26 ENCOUNTER — NON-APPOINTMENT (OUTPATIENT)
Age: 73
End: 2023-07-26

## 2023-07-26 VITALS
BODY MASS INDEX: 28.63 KG/M2 | HEART RATE: 80 BPM | OXYGEN SATURATION: 95 % | DIASTOLIC BLOOD PRESSURE: 83 MMHG | HEIGHT: 70 IN | WEIGHT: 200 LBS | SYSTOLIC BLOOD PRESSURE: 146 MMHG

## 2023-07-26 DIAGNOSIS — R03.0 ELEVATED BLOOD-PRESSURE READING, W/OUT DIAGNOSIS OF HYPERTENSION: ICD-10-CM

## 2023-07-26 DIAGNOSIS — H40.053 OCULAR HYPERTENSION, BILATERAL: ICD-10-CM

## 2023-07-26 PROCEDURE — 99215 OFFICE O/P EST HI 40 MIN: CPT

## 2023-07-26 PROCEDURE — 93000 ELECTROCARDIOGRAM COMPLETE: CPT

## 2023-07-26 RX ORDER — BIMATOPROST 0.1 MG/ML
0.01 SOLUTION/ DROPS OPHTHALMIC
Qty: 2 | Refills: 0 | Status: DISCONTINUED | COMMUNITY
Start: 2022-08-25 | End: 2023-07-26

## 2023-07-26 RX ORDER — PREDNISOLONE ACETATE 10 MG/ML
1 SUSPENSION/ DROPS OPHTHALMIC
Qty: 5 | Refills: 0 | Status: DISCONTINUED | COMMUNITY
Start: 2022-08-02 | End: 2023-07-26

## 2023-07-26 RX ORDER — LATANOPROST/PF 0.005 %
0.01 DROPS OPHTHALMIC (EYE)
Refills: 0 | Status: ACTIVE | COMMUNITY

## 2023-10-13 ENCOUNTER — RX RENEWAL (OUTPATIENT)
Age: 73
End: 2023-10-13

## 2023-10-18 ENCOUNTER — APPOINTMENT (OUTPATIENT)
Dept: CARDIOLOGY | Facility: CLINIC | Age: 73
End: 2023-10-18
Payer: MEDICARE

## 2023-10-18 VITALS
BODY MASS INDEX: 29.35 KG/M2 | HEIGHT: 70 IN | DIASTOLIC BLOOD PRESSURE: 82 MMHG | SYSTOLIC BLOOD PRESSURE: 167 MMHG | OXYGEN SATURATION: 98 % | WEIGHT: 205 LBS | HEART RATE: 75 BPM

## 2023-10-18 DIAGNOSIS — R94.31 ABNORMAL ELECTROCARDIOGRAM [ECG] [EKG]: ICD-10-CM

## 2023-10-18 DIAGNOSIS — I10 ESSENTIAL (PRIMARY) HYPERTENSION: ICD-10-CM

## 2023-10-18 DIAGNOSIS — I77.810 THORACIC AORTIC ECTASIA: ICD-10-CM

## 2023-10-18 DIAGNOSIS — R00.2 PALPITATIONS: ICD-10-CM

## 2023-10-18 PROCEDURE — 93000 ELECTROCARDIOGRAM COMPLETE: CPT

## 2023-10-18 PROCEDURE — 99215 OFFICE O/P EST HI 40 MIN: CPT

## 2023-10-26 ENCOUNTER — APPOINTMENT (OUTPATIENT)
Dept: CARDIOLOGY | Facility: CLINIC | Age: 73
End: 2023-10-26
Payer: MEDICARE

## 2023-10-26 PROCEDURE — 93790 AMBL BP MNTR W/SW I&R: CPT

## 2023-10-26 PROCEDURE — 93306 TTE W/DOPPLER COMPLETE: CPT

## 2023-11-30 ENCOUNTER — OFFICE (OUTPATIENT)
Dept: URBAN - METROPOLITAN AREA CLINIC 109 | Facility: CLINIC | Age: 73
Setting detail: OPHTHALMOLOGY
End: 2023-11-30
Payer: MEDICARE

## 2023-11-30 DIAGNOSIS — H40.1111: ICD-10-CM

## 2023-11-30 DIAGNOSIS — H40.1122: ICD-10-CM

## 2023-11-30 PROCEDURE — 92133 CPTRZD OPH DX IMG PST SGM ON: CPT | Performed by: OPHTHALMOLOGY

## 2023-11-30 PROCEDURE — 92083 EXTENDED VISUAL FIELD XM: CPT | Performed by: OPHTHALMOLOGY

## 2023-11-30 PROCEDURE — 92014 COMPRE OPH EXAM EST PT 1/>: CPT | Performed by: OPHTHALMOLOGY

## 2023-11-30 ASSESSMENT — REFRACTION_AUTOREFRACTION
OS_AXIS: 098
OS_SPHERE: +0.50
OD_CYLINDER: -0.25
OS_CYLINDER: -1.00
OD_AXIS: 129
OD_SPHERE: +1.00

## 2023-11-30 ASSESSMENT — REFRACTION_CURRENTRX
OD_SPHERE: +3.00
OD_SPHERE: +1.50
OS_OVR_VA: 20/
OS_OVR_VA: 20/
OS_SPHERE: +3.25
OS_SPHERE: +1.50
OD_OVR_VA: 20/
OD_OVR_VA: 20/

## 2023-11-30 ASSESSMENT — CONFRONTATIONAL VISUAL FIELD TEST (CVF)
OD_FINDINGS: FULL
OS_FINDINGS: FULL

## 2023-11-30 ASSESSMENT — REFRACTION_MANIFEST
OD_SPHERE: +2.00
OS_SPHERE: PLANO
OD_VA1: 20/20+
OS_CYLINDER: -1.50
OS_SPHERE: +1.00
OS_VA1: 20/20
OS_AXIS: 105
OD_VA1: 20/20-
OS_AXIS: 105
OS_CYLINDER: -0.75
OS_AXIS: 100
OS_SPHERE: +2.25
OS_CYLINDER: -2.00
OS_VA1: 20/20-
OD_SPHERE: +1.00
OD_SPHERE: +0.50

## 2023-11-30 ASSESSMENT — SPHEQUIV_DERIVED
OS_SPHEQUIV: 0
OD_SPHEQUIV: 0.875
OS_SPHEQUIV: 1.5
OS_SPHEQUIV: 0

## 2023-11-30 ASSESSMENT — LID POSITION - DERMATOCHALASIS
OD_DERMATOCHALASIS: 1+
OS_DERMATOCHALASIS: 1+

## 2023-11-30 ASSESSMENT — LID POSITION - PTOSIS: OS_PTOSIS: LUL 1+

## 2023-12-15 ENCOUNTER — OFFICE (OUTPATIENT)
Dept: URBAN - METROPOLITAN AREA CLINIC 109 | Facility: CLINIC | Age: 73
Setting detail: OPHTHALMOLOGY
End: 2023-12-15
Payer: MEDICARE

## 2023-12-15 VITALS — HEIGHT: 60 IN

## 2023-12-15 DIAGNOSIS — H40.1111: ICD-10-CM

## 2023-12-15 DIAGNOSIS — H40.1122: ICD-10-CM

## 2023-12-15 PROCEDURE — 92020 GONIOSCOPY: CPT | Performed by: OPHTHALMOLOGY

## 2023-12-15 PROCEDURE — 99213 OFFICE O/P EST LOW 20 MIN: CPT | Performed by: OPHTHALMOLOGY

## 2023-12-15 ASSESSMENT — REFRACTION_MANIFEST
OS_SPHERE: +2.25
OD_SPHERE: +1.00
OS_VA1: 20/20-
OD_SPHERE: +0.50
OS_CYLINDER: -0.75
OS_AXIS: 100
OS_CYLINDER: -2.00
OS_AXIS: 105
OD_VA1: 20/20-
OS_AXIS: 105
OD_VA1: 20/20+
OD_SPHERE: +2.00
OS_SPHERE: PLANO
OS_CYLINDER: -1.50
OS_SPHERE: +1.00
OS_VA1: 20/20

## 2023-12-15 ASSESSMENT — SPHEQUIV_DERIVED
OS_SPHEQUIV: 1.5
OD_SPHEQUIV: 0.875
OS_SPHEQUIV: 0
OS_SPHEQUIV: 0

## 2023-12-15 ASSESSMENT — CONFRONTATIONAL VISUAL FIELD TEST (CVF)
OD_FINDINGS: FULL
OS_FINDINGS: FULL

## 2023-12-15 ASSESSMENT — REFRACTION_CURRENTRX
OD_OVR_VA: 20/
OD_OVR_VA: 20/
OD_SPHERE: +3.00
OD_SPHERE: +1.50
OS_OVR_VA: 20/
OS_SPHERE: +3.25
OS_SPHERE: +1.50
OS_OVR_VA: 20/

## 2023-12-15 ASSESSMENT — REFRACTION_AUTOREFRACTION
OD_CYLINDER: -0.25
OS_SPHERE: +0.50
OS_CYLINDER: -1.00
OD_AXIS: 129
OD_SPHERE: +1.00
OS_AXIS: 098

## 2023-12-15 ASSESSMENT — LID POSITION - DERMATOCHALASIS
OD_DERMATOCHALASIS: 1+
OS_DERMATOCHALASIS: 1+

## 2023-12-15 ASSESSMENT — LID POSITION - PTOSIS: OS_PTOSIS: LUL 1+

## 2024-01-19 ENCOUNTER — OFFICE (OUTPATIENT)
Dept: URBAN - METROPOLITAN AREA CLINIC 109 | Facility: CLINIC | Age: 74
Setting detail: OPHTHALMOLOGY
End: 2024-01-19
Payer: MEDICARE

## 2024-01-19 DIAGNOSIS — H40.1111: ICD-10-CM

## 2024-01-19 PROCEDURE — 65855 TRABECULOPLASTY LASER SURG: CPT | Mod: RT | Performed by: OPHTHALMOLOGY

## 2024-01-19 ASSESSMENT — REFRACTION_MANIFEST
OS_SPHERE: +1.00
OS_CYLINDER: -1.50
OD_VA1: 20/20+
OS_CYLINDER: -2.00
OS_SPHERE: +2.25
OD_SPHERE: +0.50
OD_SPHERE: +1.00
OS_CYLINDER: -0.75
OS_AXIS: 105
OS_AXIS: 100
OS_AXIS: 105
OD_VA1: 20/20-
OS_SPHERE: PLANO
OD_SPHERE: +2.00
OS_VA1: 20/20
OS_VA1: 20/20-

## 2024-01-19 ASSESSMENT — REFRACTION_CURRENTRX
OS_SPHERE: +1.50
OS_OVR_VA: 20/
OD_OVR_VA: 20/
OD_OVR_VA: 20/
OS_OVR_VA: 20/
OD_SPHERE: +1.50
OD_SPHERE: +3.00
OS_SPHERE: +3.25

## 2024-01-19 ASSESSMENT — REFRACTION_AUTOREFRACTION
OD_SPHERE: +1.00
OD_CYLINDER: -0.25
OS_AXIS: 098
OD_AXIS: 129
OS_CYLINDER: -1.00
OS_SPHERE: +0.50

## 2024-01-19 ASSESSMENT — SPHEQUIV_DERIVED
OD_SPHEQUIV: 0.875
OS_SPHEQUIV: 0
OS_SPHEQUIV: 1.5
OS_SPHEQUIV: 0

## 2024-01-19 ASSESSMENT — CONFRONTATIONAL VISUAL FIELD TEST (CVF)
OS_FINDINGS: FULL
OD_FINDINGS: FULL

## 2024-01-19 ASSESSMENT — LID POSITION - PTOSIS: OS_PTOSIS: LUL 1+

## 2024-01-19 ASSESSMENT — LID POSITION - DERMATOCHALASIS
OD_DERMATOCHALASIS: 1+
OS_DERMATOCHALASIS: 1+

## 2024-01-26 ENCOUNTER — OFFICE (OUTPATIENT)
Dept: URBAN - METROPOLITAN AREA CLINIC 109 | Facility: CLINIC | Age: 74
Setting detail: OPHTHALMOLOGY
End: 2024-01-26
Payer: MEDICARE

## 2024-01-26 DIAGNOSIS — H40.1122: ICD-10-CM

## 2024-01-26 PROCEDURE — 65855 TRABECULOPLASTY LASER SURG: CPT | Mod: 79,LT | Performed by: OPHTHALMOLOGY

## 2024-01-26 ASSESSMENT — REFRACTION_MANIFEST
OD_SPHERE: +2.00
OS_AXIS: 105
OS_SPHERE: +2.25
OS_AXIS: 105
OS_VA1: 20/20-
OD_SPHERE: +1.00
OS_SPHERE: PLANO
OS_VA1: 20/20
OD_SPHERE: +0.50
OS_CYLINDER: -2.00
OD_VA1: 20/20-
OS_CYLINDER: -1.50
OS_SPHERE: +1.00
OS_CYLINDER: -0.75
OS_AXIS: 100
OD_VA1: 20/20+

## 2024-01-26 ASSESSMENT — REFRACTION_CURRENTRX
OD_SPHERE: +1.50
OS_SPHERE: +3.25
OD_SPHERE: +3.00
OD_OVR_VA: 20/
OS_OVR_VA: 20/
OS_OVR_VA: 20/
OD_OVR_VA: 20/
OS_SPHERE: +1.50

## 2024-01-26 ASSESSMENT — SPHEQUIV_DERIVED
OD_SPHEQUIV: 0.875
OS_SPHEQUIV: 0
OS_SPHEQUIV: 1.5
OS_SPHEQUIV: 0

## 2024-01-26 ASSESSMENT — REFRACTION_AUTOREFRACTION
OD_SPHERE: +1.00
OS_AXIS: 098
OS_CYLINDER: -1.00
OD_CYLINDER: -0.25
OD_AXIS: 129
OS_SPHERE: +0.50

## 2024-01-26 ASSESSMENT — LID POSITION - PTOSIS: OS_PTOSIS: LUL 1+

## 2024-01-26 ASSESSMENT — CONFRONTATIONAL VISUAL FIELD TEST (CVF)
OS_FINDINGS: FULL
OD_FINDINGS: FULL

## 2024-01-26 ASSESSMENT — LID POSITION - DERMATOCHALASIS
OD_DERMATOCHALASIS: 1+
OS_DERMATOCHALASIS: 1+

## 2024-03-22 ENCOUNTER — OFFICE (OUTPATIENT)
Dept: URBAN - METROPOLITAN AREA CLINIC 109 | Facility: CLINIC | Age: 74
Setting detail: OPHTHALMOLOGY
End: 2024-03-22
Payer: MEDICARE

## 2024-03-22 DIAGNOSIS — H40.1111: ICD-10-CM

## 2024-03-22 DIAGNOSIS — H40.1122: ICD-10-CM

## 2024-03-22 PROCEDURE — 92012 INTRM OPH EXAM EST PATIENT: CPT | Performed by: OPHTHALMOLOGY

## 2024-03-22 ASSESSMENT — REFRACTION_MANIFEST
OD_SPHERE: +2.00
OD_SPHERE: +0.50
OS_VA1: 20/20-
OS_CYLINDER: -2.00
OS_VA1: 20/20
OS_AXIS: 105
OS_AXIS: 100
OS_SPHERE: PLANO
OS_CYLINDER: -1.50
OS_CYLINDER: -0.75
OS_AXIS: 105
OD_VA1: 20/20-
OD_SPHERE: +1.00
OS_SPHERE: +1.00
OD_VA1: 20/20+
OS_SPHERE: +2.25

## 2024-03-22 ASSESSMENT — SPHEQUIV_DERIVED
OS_SPHEQUIV: 0
OS_SPHEQUIV: 1.5

## 2024-03-22 ASSESSMENT — LID POSITION - DERMATOCHALASIS
OD_DERMATOCHALASIS: 1+
OS_DERMATOCHALASIS: 1+

## 2024-03-22 ASSESSMENT — REFRACTION_CURRENTRX
OS_OVR_VA: 20/
OS_SPHERE: +3.25
OD_SPHERE: +1.50
OD_SPHERE: +3.00
OS_OVR_VA: 20/
OS_SPHERE: +1.50
OD_OVR_VA: 20/
OD_OVR_VA: 20/

## 2024-03-22 ASSESSMENT — LID POSITION - PTOSIS: OS_PTOSIS: LUL 1+

## 2024-05-30 ENCOUNTER — OFFICE (OUTPATIENT)
Dept: URBAN - METROPOLITAN AREA CLINIC 109 | Facility: CLINIC | Age: 74
Setting detail: OPHTHALMOLOGY
End: 2024-05-30
Payer: MEDICARE

## 2024-05-30 DIAGNOSIS — H40.1122: ICD-10-CM

## 2024-05-30 DIAGNOSIS — H40.1111: ICD-10-CM

## 2024-05-30 PROCEDURE — 99212 OFFICE O/P EST SF 10 MIN: CPT | Performed by: OPHTHALMOLOGY

## 2024-05-30 ASSESSMENT — LID POSITION - DERMATOCHALASIS
OS_DERMATOCHALASIS: 1+
OD_DERMATOCHALASIS: 1+

## 2024-05-30 ASSESSMENT — CONFRONTATIONAL VISUAL FIELD TEST (CVF)
OD_FINDINGS: FULL
OS_FINDINGS: FULL

## 2024-05-30 ASSESSMENT — LID POSITION - PTOSIS: OS_PTOSIS: LUL 1+

## 2024-09-25 ENCOUNTER — RX RENEWAL (OUTPATIENT)
Age: 74
End: 2024-09-25

## 2024-09-26 ENCOUNTER — OFFICE (OUTPATIENT)
Dept: URBAN - METROPOLITAN AREA CLINIC 109 | Facility: CLINIC | Age: 74
Setting detail: OPHTHALMOLOGY
End: 2024-09-26
Payer: MEDICARE

## 2024-09-26 DIAGNOSIS — H40.1111: ICD-10-CM

## 2024-09-26 DIAGNOSIS — H40.1122: ICD-10-CM

## 2024-09-26 DIAGNOSIS — E11.9: ICD-10-CM

## 2024-09-26 PROCEDURE — 92014 COMPRE OPH EXAM EST PT 1/>: CPT | Performed by: OPHTHALMOLOGY

## 2024-09-26 PROCEDURE — 92083 EXTENDED VISUAL FIELD XM: CPT | Performed by: OPHTHALMOLOGY

## 2024-09-26 PROCEDURE — 92133 CPTRZD OPH DX IMG PST SGM ON: CPT | Performed by: OPHTHALMOLOGY

## 2024-09-26 ASSESSMENT — CONFRONTATIONAL VISUAL FIELD TEST (CVF)
OS_FINDINGS: FULL
OD_FINDINGS: FULL

## 2024-09-26 ASSESSMENT — LID POSITION - DERMATOCHALASIS
OS_DERMATOCHALASIS: 1+
OD_DERMATOCHALASIS: 1+

## 2024-09-26 ASSESSMENT — LID POSITION - PTOSIS: OS_PTOSIS: LUL 1+

## 2025-01-06 ENCOUNTER — NON-APPOINTMENT (OUTPATIENT)
Age: 75
End: 2025-01-06

## 2025-01-06 ENCOUNTER — APPOINTMENT (OUTPATIENT)
Dept: CARDIOLOGY | Facility: CLINIC | Age: 75
End: 2025-01-06
Payer: MEDICARE

## 2025-01-06 VITALS
DIASTOLIC BLOOD PRESSURE: 86 MMHG | HEART RATE: 77 BPM | BODY MASS INDEX: 28.49 KG/M2 | SYSTOLIC BLOOD PRESSURE: 150 MMHG | WEIGHT: 199 LBS | OXYGEN SATURATION: 95 % | HEIGHT: 70 IN

## 2025-01-06 DIAGNOSIS — I77.810 THORACIC AORTIC ECTASIA: ICD-10-CM

## 2025-01-06 DIAGNOSIS — Z01.810 ENCOUNTER FOR PREPROCEDURAL CARDIOVASCULAR EXAMINATION: ICD-10-CM

## 2025-01-06 DIAGNOSIS — R94.31 ABNORMAL ELECTROCARDIOGRAM [ECG] [EKG]: ICD-10-CM

## 2025-01-06 DIAGNOSIS — I10 ESSENTIAL (PRIMARY) HYPERTENSION: ICD-10-CM

## 2025-01-06 PROCEDURE — 93000 ELECTROCARDIOGRAM COMPLETE: CPT

## 2025-01-06 PROCEDURE — 99215 OFFICE O/P EST HI 40 MIN: CPT

## 2025-01-06 PROCEDURE — G2211 COMPLEX E/M VISIT ADD ON: CPT

## 2025-01-21 ENCOUNTER — OFFICE (OUTPATIENT)
Dept: URBAN - METROPOLITAN AREA CLINIC 109 | Facility: CLINIC | Age: 75
Setting detail: OPHTHALMOLOGY
End: 2025-01-21
Payer: MEDICARE

## 2025-01-21 ENCOUNTER — RX ONLY (RX ONLY)
Age: 75
End: 2025-01-21

## 2025-01-21 DIAGNOSIS — H16.221: ICD-10-CM

## 2025-01-21 DIAGNOSIS — H16.223: ICD-10-CM

## 2025-01-21 DIAGNOSIS — H02.89: ICD-10-CM

## 2025-01-21 DIAGNOSIS — H16.222: ICD-10-CM

## 2025-01-21 PROBLEM — H02.831 DERMATOCHALASIS; RIGHT UPPER LID, LEFT UPPER LID: Status: ACTIVE | Noted: 2025-01-21

## 2025-01-21 PROBLEM — H02.834 DERMATOCHALASIS; RIGHT UPPER LID, LEFT UPPER LID: Status: ACTIVE | Noted: 2025-01-21

## 2025-01-21 PROCEDURE — 83861 MICROFLUID ANALY TEARS: CPT | Mod: QW,LT | Performed by: OPTOMETRIST

## 2025-01-21 PROCEDURE — 99213 OFFICE O/P EST LOW 20 MIN: CPT | Performed by: OPTOMETRIST

## 2025-01-21 PROCEDURE — 83861 MICROFLUID ANALY TEARS: CPT | Mod: QW,RT | Performed by: OPTOMETRIST

## 2025-01-21 ASSESSMENT — TONOMETRY
OD_IOP_MMHG: 11
OS_IOP_MMHG: 11

## 2025-01-21 ASSESSMENT — REFRACTION_CURRENTRX
OD_OVR_VA: 20/
OS_OVR_VA: 20/
OS_OVR_VA: 20/
OD_SPHERE: +3.00
OS_SPHERE: +1.50
OD_OVR_VA: 20/
OS_SPHERE: +3.25
OD_SPHERE: +1.50

## 2025-01-21 ASSESSMENT — REFRACTION_AUTOREFRACTION
OS_AXIS: 103
OS_SPHERE: +0.25
OD_AXIS: 070
OD_CYLINDER: -0.25
OS_CYLINDER: -0.50
OD_SPHERE: +0.50

## 2025-01-21 ASSESSMENT — KERATOMETRY
OD_K1POWER_DIOPTERS: 42.75
OS_K1POWER_DIOPTERS: 42.62
OS_K2POWER_DIOPTERS: 43.50
OS_AXISANGLE_DEGREES: 167
OD_AXISANGLE_DEGREES: 115
OD_K2POWER_DIOPTERS: 42.87

## 2025-01-21 ASSESSMENT — REFRACTION_MANIFEST
OS_CYLINDER: -0.75
OS_CYLINDER: -2.00
OD_VA1: 20/20+
OD_VA1: 20/20-
OS_SPHERE: +2.25
OS_AXIS: 100
OS_SPHERE: PLANO
OS_AXIS: 105
OS_SPHERE: +1.00
OS_VA1: 20/20
OS_CYLINDER: -1.50
OS_VA1: 20/20-
OD_SPHERE: +0.50
OD_SPHERE: +2.00
OD_SPHERE: +1.00
OS_AXIS: 105

## 2025-01-21 ASSESSMENT — LID POSITION - PTOSIS: OS_PTOSIS: LUL 1+

## 2025-01-21 ASSESSMENT — VISUAL ACUITY
OS_BCVA: 20/20
OD_BCVA: 20/20

## 2025-01-21 ASSESSMENT — PACHYMETRY
OD_CT_CORRECTION: 5
OS_CT_UM: 503
OD_CT_UM: 477
OS_CT_CORRECTION: 3

## 2025-01-21 ASSESSMENT — LID EXAM ASSESSMENTS
OD_MEIBOMITIS: RLL RUL T
OS_MEIBOMITIS: LLL LUL T

## 2025-01-21 ASSESSMENT — LID POSITION - DERMATOCHALASIS
OS_DERMATOCHALASIS: 1+
OD_DERMATOCHALASIS: 1+

## 2025-02-04 ENCOUNTER — NON-APPOINTMENT (OUTPATIENT)
Age: 75
End: 2025-02-04

## 2025-02-04 ENCOUNTER — APPOINTMENT (OUTPATIENT)
Dept: UROLOGY | Facility: CLINIC | Age: 75
End: 2025-02-04
Payer: MEDICARE

## 2025-02-04 ENCOUNTER — OFFICE (OUTPATIENT)
Dept: URBAN - METROPOLITAN AREA CLINIC 109 | Facility: CLINIC | Age: 75
Setting detail: OPHTHALMOLOGY
End: 2025-02-04
Payer: MEDICARE

## 2025-02-04 VITALS
BODY MASS INDEX: 28.2 KG/M2 | HEIGHT: 70 IN | RESPIRATION RATE: 17 BRPM | SYSTOLIC BLOOD PRESSURE: 149 MMHG | TEMPERATURE: 97.9 F | HEART RATE: 77 BPM | WEIGHT: 197 LBS | DIASTOLIC BLOOD PRESSURE: 77 MMHG

## 2025-02-04 DIAGNOSIS — H16.223: ICD-10-CM

## 2025-02-04 DIAGNOSIS — R93.5 ABNORMAL FINDINGS ON DIAGNOSTIC IMAGING OF OTHER ABDOMINAL REGIONS, INCLUDING RETROPERITONEUM: ICD-10-CM

## 2025-02-04 DIAGNOSIS — H02.89: ICD-10-CM

## 2025-02-04 PROCEDURE — 99212 OFFICE O/P EST SF 10 MIN: CPT | Performed by: OPTOMETRIST

## 2025-02-04 PROCEDURE — G2211 COMPLEX E/M VISIT ADD ON: CPT

## 2025-02-04 PROCEDURE — 99204 OFFICE O/P NEW MOD 45 MIN: CPT

## 2025-02-04 ASSESSMENT — REFRACTION_MANIFEST
OD_VA1: 20/20-
OS_AXIS: 100
OS_VA1: 20/20-
OD_SPHERE: +1.00
OD_SPHERE: +2.00
OS_CYLINDER: -1.50
OS_CYLINDER: -0.75
OD_SPHERE: +0.50
OD_VA1: 20/20+
OS_AXIS: 105
OS_AXIS: 105
OS_CYLINDER: -2.00
OS_SPHERE: PLANO
OS_SPHERE: +1.00
OS_SPHERE: +2.25
OS_VA1: 20/20

## 2025-02-04 ASSESSMENT — REFRACTION_CURRENTRX
OD_OVR_VA: 20/
OD_SPHERE: +3.00
OS_SPHERE: +1.50
OS_SPHERE: +3.25
OS_OVR_VA: 20/
OD_OVR_VA: 20/
OS_OVR_VA: 20/
OD_SPHERE: +1.50

## 2025-02-04 ASSESSMENT — REFRACTION_AUTOREFRACTION
OD_AXIS: 104
OD_SPHERE: +0.75
OS_CYLINDER: -0.50
OS_AXIS: 090
OS_SPHERE: +0.25
OD_CYLINDER: -0.25

## 2025-02-04 ASSESSMENT — PACHYMETRY
OS_CT_UM: 503
OD_CT_UM: 477
OS_CT_CORRECTION: 3
OD_CT_CORRECTION: 5

## 2025-02-04 ASSESSMENT — LID POSITION - PTOSIS: OS_PTOSIS: LUL 1+

## 2025-02-04 ASSESSMENT — LID POSITION - DERMATOCHALASIS
OS_DERMATOCHALASIS: 1+
OD_DERMATOCHALASIS: 1+

## 2025-02-04 ASSESSMENT — KERATOMETRY
OD_AXISANGLE_DEGREES: 115
OD_K1POWER_DIOPTERS: 42.75
OD_K2POWER_DIOPTERS: 42.87
OS_K1POWER_DIOPTERS: 42.62
OS_AXISANGLE_DEGREES: 167
OS_K2POWER_DIOPTERS: 43.50

## 2025-02-04 ASSESSMENT — TONOMETRY
OS_IOP_MMHG: 12
OD_IOP_MMHG: 12

## 2025-02-04 ASSESSMENT — CONFRONTATIONAL VISUAL FIELD TEST (CVF)
OS_FINDINGS: FULL
OD_FINDINGS: FULL

## 2025-02-04 ASSESSMENT — VISUAL ACUITY
OS_BCVA: 20/20-2
OD_BCVA: 20/25

## 2025-02-04 ASSESSMENT — LID EXAM ASSESSMENTS
OD_MEIBOMITIS: RLL RUL T
OS_MEIBOMITIS: LLL LUL T

## 2025-02-07 ENCOUNTER — OUTPATIENT (OUTPATIENT)
Dept: OUTPATIENT SERVICES | Facility: HOSPITAL | Age: 75
LOS: 1 days | End: 2025-02-07
Payer: MEDICARE

## 2025-02-07 DIAGNOSIS — R97.20 ELEVATED PROSTATE SPECIFIC ANTIGEN [PSA]: ICD-10-CM

## 2025-02-07 PROCEDURE — C8001: CPT

## 2025-02-27 ENCOUNTER — NON-APPOINTMENT (OUTPATIENT)
Age: 75
End: 2025-02-27

## 2025-03-06 ENCOUNTER — OUTPATIENT (OUTPATIENT)
Dept: OUTPATIENT SERVICES | Facility: HOSPITAL | Age: 75
LOS: 1 days | End: 2025-03-06
Payer: MEDICARE

## 2025-03-06 ENCOUNTER — APPOINTMENT (OUTPATIENT)
Dept: UROLOGY | Facility: CLINIC | Age: 75
End: 2025-03-06
Payer: MEDICARE

## 2025-03-06 VITALS — SYSTOLIC BLOOD PRESSURE: 147 MMHG | DIASTOLIC BLOOD PRESSURE: 97 MMHG | HEART RATE: 92 BPM

## 2025-03-06 DIAGNOSIS — R35.0 FREQUENCY OF MICTURITION: ICD-10-CM

## 2025-03-06 DIAGNOSIS — R93.5 ABNORMAL FINDINGS ON DIAGNOSTIC IMAGING OF OTHER ABDOMINAL REGIONS, INCLUDING RETROPERITONEUM: ICD-10-CM

## 2025-03-06 PROCEDURE — 76999F: CUSTOM | Mod: 26

## 2025-03-06 PROCEDURE — 55700: CPT

## 2025-03-06 PROCEDURE — 76999 ECHO EXAMINATION PROCEDURE: CPT

## 2025-03-07 ENCOUNTER — NON-APPOINTMENT (OUTPATIENT)
Age: 75
End: 2025-03-07

## 2025-03-07 DIAGNOSIS — R93.5 ABNORMAL FINDINGS ON DIAGNOSTIC IMAGING OF OTHER ABDOMINAL REGIONS, INCLUDING RETROPERITONEUM: ICD-10-CM

## 2025-03-16 LAB — PROSTATE BIOPSY: NORMAL

## 2025-03-18 ENCOUNTER — APPOINTMENT (OUTPATIENT)
Dept: UROLOGY | Facility: CLINIC | Age: 75
End: 2025-03-18

## 2025-03-20 ENCOUNTER — OFFICE (OUTPATIENT)
Dept: URBAN - METROPOLITAN AREA CLINIC 109 | Facility: CLINIC | Age: 75
Setting detail: OPHTHALMOLOGY
End: 2025-03-20
Payer: MEDICARE

## 2025-03-20 VITALS — HEIGHT: 60 IN

## 2025-03-20 DIAGNOSIS — H40.1122: ICD-10-CM

## 2025-03-20 DIAGNOSIS — H40.1111: ICD-10-CM

## 2025-03-20 DIAGNOSIS — H16.221: ICD-10-CM

## 2025-03-20 DIAGNOSIS — H16.222: ICD-10-CM

## 2025-03-20 DIAGNOSIS — H16.223: ICD-10-CM

## 2025-03-20 DIAGNOSIS — E11.9: ICD-10-CM

## 2025-03-20 DIAGNOSIS — H02.89: ICD-10-CM

## 2025-03-20 PROCEDURE — 92133 CPTRZD OPH DX IMG PST SGM ON: CPT | Performed by: OPHTHALMOLOGY

## 2025-03-20 PROCEDURE — 92083 EXTENDED VISUAL FIELD XM: CPT | Performed by: OPHTHALMOLOGY

## 2025-03-20 PROCEDURE — 92012 INTRM OPH EXAM EST PATIENT: CPT | Performed by: OPHTHALMOLOGY

## 2025-03-20 ASSESSMENT — TONOMETRY
OD_IOP_MMHG: 12
OS_IOP_MMHG: 12

## 2025-03-20 ASSESSMENT — REFRACTION_AUTOREFRACTION
OD_CYLINDER: -0.25
OD_AXIS: 168
OS_AXIS: 102
OD_SPHERE: +0.50
OS_CYLINDER: -0.75
OS_SPHERE: +0.50

## 2025-03-20 ASSESSMENT — REFRACTION_MANIFEST
OS_CYLINDER: -0.75
OS_VA1: 20/20-
OS_VA1: 20/20
OS_CYLINDER: -1.50
OS_SPHERE: +2.25
OS_CYLINDER: -2.00
OS_AXIS: 105
OS_AXIS: 100
OD_SPHERE: +0.50
OS_AXIS: 105
OD_SPHERE: +2.00
OD_VA1: 20/20-
OD_VA1: 20/20+
OS_SPHERE: +1.00
OD_SPHERE: +1.00
OS_SPHERE: PLANO

## 2025-03-20 ASSESSMENT — CONFRONTATIONAL VISUAL FIELD TEST (CVF)
OD_FINDINGS: FULL
OS_FINDINGS: FULL

## 2025-03-20 ASSESSMENT — LID EXAM ASSESSMENTS
OS_MEIBOMITIS: LLL LUL T
OD_MEIBOMITIS: RLL RUL T

## 2025-03-20 ASSESSMENT — VISUAL ACUITY
OD_BCVA: 20/20-1
OS_BCVA: 20/25-1

## 2025-03-20 ASSESSMENT — REFRACTION_CURRENTRX
OD_SPHERE: +1.50
OS_SPHERE: +1.50
OS_SPHERE: +3.25
OS_OVR_VA: 20/
OD_OVR_VA: 20/
OD_SPHERE: +3.00
OD_OVR_VA: 20/
OS_OVR_VA: 20/

## 2025-03-20 ASSESSMENT — KERATOMETRY
OD_K1POWER_DIOPTERS: 42.75
OD_K2POWER_DIOPTERS: 42.87
OD_AXISANGLE_DEGREES: 115
OS_AXISANGLE_DEGREES: 167
OS_K1POWER_DIOPTERS: 42.62
OS_K2POWER_DIOPTERS: 43.50

## 2025-03-20 ASSESSMENT — PACHYMETRY
OS_CT_CORRECTION: 3
OD_CT_UM: 477
OS_CT_UM: 503
OD_CT_CORRECTION: 5

## 2025-03-20 ASSESSMENT — LID POSITION - DERMATOCHALASIS
OD_DERMATOCHALASIS: 1+
OS_DERMATOCHALASIS: 1+

## 2025-03-20 ASSESSMENT — LID POSITION - PTOSIS: OS_PTOSIS: LUL 1+

## 2025-07-01 ENCOUNTER — APPOINTMENT (OUTPATIENT)
Dept: CARDIOLOGY | Facility: CLINIC | Age: 75
End: 2025-07-01
Payer: MEDICARE

## 2025-07-01 PROCEDURE — 93306 TTE W/DOPPLER COMPLETE: CPT

## 2025-07-02 ENCOUNTER — OFFICE (OUTPATIENT)
Dept: URBAN - METROPOLITAN AREA CLINIC 109 | Facility: CLINIC | Age: 75
Setting detail: OPHTHALMOLOGY
End: 2025-07-02
Payer: MEDICARE

## 2025-07-02 ENCOUNTER — MED ADMIN CHARGE (OUTPATIENT)
Age: 75
End: 2025-07-02

## 2025-07-02 DIAGNOSIS — H00.14: ICD-10-CM

## 2025-07-02 DIAGNOSIS — B88.0: ICD-10-CM

## 2025-07-02 DIAGNOSIS — H01.005: ICD-10-CM

## 2025-07-02 DIAGNOSIS — H01.002: ICD-10-CM

## 2025-07-02 DIAGNOSIS — H01.001: ICD-10-CM

## 2025-07-02 DIAGNOSIS — H01.004: ICD-10-CM

## 2025-07-02 PROBLEM — H02.83 DERMATOCHALASIS; RIGHT UPPER LID, LEFT UPPER LID: Status: ACTIVE | Noted: 2025-07-02

## 2025-07-02 PROCEDURE — 99213 OFFICE O/P EST LOW 20 MIN: CPT | Performed by: OPHTHALMOLOGY

## 2025-07-02 RX ORDER — PERFLUTREN 6.52 MG/ML
6.52 INJECTION, SUSPENSION INTRAVENOUS
Qty: 1 | Refills: 0 | Status: COMPLETED | OUTPATIENT
Start: 2025-07-02

## 2025-07-02 RX ADMIN — PERFLUTREN MG/ML: 6.52 INJECTION, SUSPENSION INTRAVENOUS at 00:00

## 2025-07-02 ASSESSMENT — REFRACTION_CURRENTRX
OS_SPHERE: +1.50
OS_SPHERE: +3.25
OS_OVR_VA: 20/
OD_OVR_VA: 20/
OD_OVR_VA: 20/
OD_SPHERE: +1.50
OS_OVR_VA: 20/
OD_SPHERE: +3.00

## 2025-07-02 ASSESSMENT — REFRACTION_MANIFEST
OS_AXIS: 105
OS_VA1: 20/20-
OS_VA1: 20/20
OS_SPHERE: PLANO
OS_AXIS: 105
OD_SPHERE: +0.50
OD_VA1: 20/20+
OS_CYLINDER: -0.75
OS_CYLINDER: -2.00
OS_CYLINDER: -1.50
OD_SPHERE: +1.00
OS_SPHERE: +1.00
OD_VA1: 20/20-
OS_SPHERE: +2.25
OD_SPHERE: +2.00
OS_AXIS: 100

## 2025-07-02 ASSESSMENT — KERATOMETRY
OS_K1POWER_DIOPTERS: 42.62
OD_AXISANGLE_DEGREES: 115
OD_K2POWER_DIOPTERS: 42.87
OD_K1POWER_DIOPTERS: 42.75
OS_AXISANGLE_DEGREES: 167
OS_K2POWER_DIOPTERS: 43.50

## 2025-07-02 ASSESSMENT — PACHYMETRY
OS_CT_UM: 503
OD_CT_CORRECTION: 5
OS_CT_CORRECTION: 3
OD_CT_UM: 477

## 2025-07-02 ASSESSMENT — LID POSITION - DERMATOCHALASIS
OS_DERMATOCHALASIS: 1+
OD_DERMATOCHALASIS: 1+

## 2025-07-02 ASSESSMENT — CONFRONTATIONAL VISUAL FIELD TEST (CVF)
OS_FINDINGS: FULL
OD_FINDINGS: FULL

## 2025-07-02 ASSESSMENT — REFRACTION_AUTOREFRACTION
OS_AXIS: 102
OD_SPHERE: +0.50
OS_SPHERE: +0.50
OD_CYLINDER: -0.25
OD_AXIS: 168
OS_CYLINDER: -0.75

## 2025-07-02 ASSESSMENT — LID EXAM ASSESSMENTS
OD_BLEPHARITIS: RLL RUL 1+
OD_MEIBOMITIS: RLL RUL T
OS_BLEPHARITIS: LLL LUL 1+
OS_MEIBOMITIS: LLL LUL T

## 2025-07-02 ASSESSMENT — TONOMETRY
OS_IOP_MMHG: 15
OD_IOP_MMHG: 16

## 2025-07-02 ASSESSMENT — VISUAL ACUITY
OD_BCVA: 20/25
OS_BCVA: 20/25--

## 2025-07-02 ASSESSMENT — LID POSITION - PTOSIS: OS_PTOSIS: LUL 1+

## 2025-07-08 ENCOUNTER — APPOINTMENT (OUTPATIENT)
Dept: CARDIOLOGY | Facility: CLINIC | Age: 75
End: 2025-07-08
Payer: MEDICARE

## 2025-07-08 VITALS
HEIGHT: 70 IN | HEART RATE: 82 BPM | SYSTOLIC BLOOD PRESSURE: 144 MMHG | BODY MASS INDEX: 28.63 KG/M2 | DIASTOLIC BLOOD PRESSURE: 84 MMHG | WEIGHT: 200 LBS | OXYGEN SATURATION: 96 %

## 2025-07-08 PROBLEM — Z01.810 PREOPERATIVE CARDIOVASCULAR EXAMINATION: Status: RESOLVED | Noted: 2025-01-06 | Resolved: 2025-07-08

## 2025-07-08 PROCEDURE — G2211 COMPLEX E/M VISIT ADD ON: CPT

## 2025-07-08 PROCEDURE — 99215 OFFICE O/P EST HI 40 MIN: CPT

## 2025-07-08 PROCEDURE — 93000 ELECTROCARDIOGRAM COMPLETE: CPT

## 2025-07-21 ENCOUNTER — APPOINTMENT (OUTPATIENT)
Dept: CT IMAGING | Facility: CLINIC | Age: 75
End: 2025-07-21
Payer: MEDICARE

## 2025-07-21 ENCOUNTER — OUTPATIENT (OUTPATIENT)
Dept: OUTPATIENT SERVICES | Facility: HOSPITAL | Age: 75
LOS: 1 days | End: 2025-07-21
Payer: MEDICARE

## 2025-07-21 DIAGNOSIS — I77.810 THORACIC AORTIC ECTASIA: ICD-10-CM

## 2025-07-21 PROCEDURE — 71275 CT ANGIOGRAPHY CHEST: CPT

## 2025-07-21 PROCEDURE — 71275 CT ANGIOGRAPHY CHEST: CPT | Mod: 26
